# Patient Record
Sex: MALE | Race: WHITE | Employment: FULL TIME | URBAN - METROPOLITAN AREA
[De-identification: names, ages, dates, MRNs, and addresses within clinical notes are randomized per-mention and may not be internally consistent; named-entity substitution may affect disease eponyms.]

---

## 2018-11-25 ENCOUNTER — HOSPITAL ENCOUNTER (EMERGENCY)
Facility: CLINIC | Age: 58
Discharge: HOME | End: 2018-11-25
Attending: FAMILY MEDICINE | Admitting: FAMILY MEDICINE
Payer: COMMERCIAL

## 2018-11-25 ENCOUNTER — APPOINTMENT (OUTPATIENT)
Dept: GENERAL RADIOLOGY | Facility: CLINIC | Age: 58
End: 2018-11-25
Attending: FAMILY MEDICINE
Payer: COMMERCIAL

## 2018-11-25 VITALS
DIASTOLIC BLOOD PRESSURE: 94 MMHG | OXYGEN SATURATION: 96 % | RESPIRATION RATE: 14 BRPM | HEART RATE: 64 BPM | TEMPERATURE: 97.4 F | HEIGHT: 72 IN | SYSTOLIC BLOOD PRESSURE: 141 MMHG

## 2018-11-25 DIAGNOSIS — R55 SYNCOPE, UNSPECIFIED SYNCOPE TYPE: ICD-10-CM

## 2018-11-25 DIAGNOSIS — Z72.0 TOBACCO ABUSE: ICD-10-CM

## 2018-11-25 LAB
ALBUMIN SERPL-MCNC: 4.1 G/DL (ref 3.4–5)
ALP SERPL-CCNC: 103 U/L (ref 40–150)
ALT SERPL W P-5'-P-CCNC: 34 U/L (ref 0–70)
ANION GAP SERPL CALCULATED.3IONS-SCNC: 7 MMOL/L (ref 3–14)
AST SERPL W P-5'-P-CCNC: 41 U/L (ref 0–45)
BASOPHILS # BLD AUTO: 0.1 10E9/L (ref 0–0.2)
BASOPHILS NFR BLD AUTO: 1.2 %
BILIRUB SERPL-MCNC: 0.5 MG/DL (ref 0.2–1.3)
BUN SERPL-MCNC: 13 MG/DL (ref 7–30)
CALCIUM SERPL-MCNC: 8.5 MG/DL (ref 8.5–10.1)
CHLORIDE SERPL-SCNC: 103 MMOL/L (ref 94–109)
CO2 SERPL-SCNC: 29 MMOL/L (ref 20–32)
CREAT SERPL-MCNC: 0.92 MG/DL (ref 0.66–1.25)
DIFFERENTIAL METHOD BLD: ABNORMAL
EOSINOPHIL # BLD AUTO: 0.2 10E9/L (ref 0–0.7)
EOSINOPHIL NFR BLD AUTO: 3 %
ERYTHROCYTE [DISTWIDTH] IN BLOOD BY AUTOMATED COUNT: 12 % (ref 10–15)
GFR SERPL CREATININE-BSD FRML MDRD: 85 ML/MIN/1.7M2
GLUCOSE SERPL-MCNC: 84 MG/DL (ref 70–99)
HCT VFR BLD AUTO: 46.5 % (ref 40–53)
HGB BLD-MCNC: 15.7 G/DL (ref 13.3–17.7)
IMM GRANULOCYTES # BLD: 0 10E9/L (ref 0–0.4)
IMM GRANULOCYTES NFR BLD: 0.3 %
LYMPHOCYTES # BLD AUTO: 2.8 10E9/L (ref 0.8–5.3)
LYMPHOCYTES NFR BLD AUTO: 42.5 %
MCH RBC QN AUTO: 33.5 PG (ref 26.5–33)
MCHC RBC AUTO-ENTMCNC: 33.8 G/DL (ref 31.5–36.5)
MCV RBC AUTO: 99 FL (ref 78–100)
MONOCYTES # BLD AUTO: 0.8 10E9/L (ref 0–1.3)
MONOCYTES NFR BLD AUTO: 11.9 %
NEUTROPHILS # BLD AUTO: 2.7 10E9/L (ref 1.6–8.3)
NEUTROPHILS NFR BLD AUTO: 41.1 %
NRBC # BLD AUTO: 0 10*3/UL
NRBC BLD AUTO-RTO: 0 /100
PLATELET # BLD AUTO: 215 10E9/L (ref 150–450)
POTASSIUM SERPL-SCNC: 3.9 MMOL/L (ref 3.4–5.3)
PROT SERPL-MCNC: 8.1 G/DL (ref 6.8–8.8)
RBC # BLD AUTO: 4.68 10E12/L (ref 4.4–5.9)
SODIUM SERPL-SCNC: 139 MMOL/L (ref 133–144)
TROPONIN I SERPL-MCNC: <0.015 UG/L (ref 0–0.04)
WBC # BLD AUTO: 6.6 10E9/L (ref 4–11)

## 2018-11-25 PROCEDURE — 99285 EMERGENCY DEPT VISIT HI MDM: CPT | Mod: 25

## 2018-11-25 PROCEDURE — 93005 ELECTROCARDIOGRAM TRACING: CPT

## 2018-11-25 PROCEDURE — 71046 X-RAY EXAM CHEST 2 VIEWS: CPT

## 2018-11-25 PROCEDURE — 84484 ASSAY OF TROPONIN QUANT: CPT | Performed by: FAMILY MEDICINE

## 2018-11-25 PROCEDURE — 99285 EMERGENCY DEPT VISIT HI MDM: CPT | Mod: 25 | Performed by: FAMILY MEDICINE

## 2018-11-25 PROCEDURE — 80053 COMPREHEN METABOLIC PANEL: CPT | Performed by: FAMILY MEDICINE

## 2018-11-25 PROCEDURE — 85025 COMPLETE CBC W/AUTO DIFF WBC: CPT | Performed by: FAMILY MEDICINE

## 2018-11-25 PROCEDURE — 93010 ELECTROCARDIOGRAM REPORT: CPT | Mod: Z6 | Performed by: FAMILY MEDICINE

## 2018-11-25 ASSESSMENT — ENCOUNTER SYMPTOMS
SINUS PRESSURE: 0
WHEEZING: 0
SHORTNESS OF BREATH: 0
HEADACHES: 0
PALPITATIONS: 0
SORE THROAT: 0
DIARRHEA: 0
FREQUENCY: 0
VOMITING: 1
CHILLS: 0
CONSTIPATION: 0
DIAPHORESIS: 0
NAUSEA: 0
FEVER: 0
BLOOD IN STOOL: 0
ABDOMINAL PAIN: 0
COUGH: 0
DYSURIA: 0

## 2018-11-25 NOTE — ED AVS SNAPSHOT
[facility] Emergency Department    [address] [address]    Phone:  [phone]    Fax:  [phone]                                       Paul Q Severson   MRN: [id number]    Department:  [facility] Emergency Department   Date of Visit:  11/25/2018           After Visit Summary Signature Page     I have received my discharge instructions, and my questions have been answered. I have discussed any challenges I see with this plan with the nurse or doctor.    ..........................................................................................................................................  Patient/Patient Representative Signature      ..........................................................................................................................................  Patient Representative Print Name and Relationship to Patient    ..................................................               ................................................  Date                                   Time    ..........................................................................................................................................  Reviewed by Signature/Title    ...................................................              ..............................................  Date                                               Time          22EPIC Rev 08/18

## 2018-11-25 NOTE — ED NOTES
Pt here with syncopal episode. Pt started to feel faint at [facility] when trying to pay then he went in the car his Wife was driving home and she suddenly heard him snoring. Pt was unaware that he had passed out in the car. Emesis x1 since. Denies SOB, denies chest pain.

## 2018-11-25 NOTE — ED AVS SNAPSHOT
[facility] Emergency Department    [address] [address]    Phone:  [phone]    Fax:  [phone]                                       Paul Q Severson   MRN: [id number]    Department:  [facility] Emergency Department   Date of Visit:  11/25/2018           Patient Information     Date Of Birth          1960        Your diagnoses for this visit were:     Syncope, unspecified syncope type Unclear cause.  Normal evaluation here. This may have been a heart rhythm abnormality and may be considered for zio monitor for 7 days.  re-evaluation in clinic this week. stay around others today and return for any recurrent episodes.    Tobacco abuse quit!       You were seen by Saulo Chandler MD.      Follow-up Information     Follow up with Gayathri Castro Schedule an appointment as soon as possible for a visit in 1 day.    Specialty:  Physician Assistant    Contact information:    [place] [address]  [place] [id number] [phone]          Follow up with [facility] Emergency Department.    Specialty:  EMERGENCY MEDICINE    Why:  If symptoms worsen, As needed    Contact information:    [address] [place] [address] [phone]    Additional information:    The medical center is located at   [address] (between [address] in [place], four miles north   of [place]).        Discharge Instructions         ICD-10-CM    1. Syncope, unspecified syncope type R55     Unclear cause.  Normal evaluation here. This may have been a heart rhythm abnormality and may be considered for zio monitor for 7 days.  re-evaluation in clinic this week. stay around others today and return for any recurrent episodes.   2. Tobacco abuse Z72.0     quit!         Causes of Syncope  Syncope (fainting) has many causes. Sometimes it is not serious. In other cases, syncope is a sign of a heart problem. But treatment can help    When syncope is not serious  Your  healthcare provider may call your problem vasovagal syncope, reflex syncope, or orthostatic hypotension. These types of syncope are generally not serious. They can be caused by:    Strong feelings, such as anxiety or fear. A nerve signal may briefly change your heart rate and lower your blood pressure too much.    Standing for too long. Standing may cause blood to pool in your legs. When this happens, your brain may not receive all the blood it needs.    Standing up too quickly. Your blood pressure may not adjust fast enough to changes in posture and may drop too low. Certain medicines can also cause this problem. Examples of medicines that can cause a drop in blood pressure include diuretics, blood pressure medicines, and medicines for chest pain. Your pharmacist or healthcare provider can discuss these with you.    Reaction to normal body functions. When you go to the bathroom, have gastrointestinal discomfort, nausea, or pain, your heart may have a natural reflex to slow down and lower blood pressure. This can result in syncope. This may also follow exercise, eating, laughter, weight lifting, or playing musical instruments like the trumpet or trombone.  When heart trouble causes syncope  A heart problem can decrease the amount of oxygen-rich blood that reaches the brain. Heart trouble can be serious and even life threatening if not treated:    A slow heart rate. Electrical signals tell the chambers of the heart when to pump. But the signals may be slowed or blocked (heart block) as they travel on the heart s electrical pathways. This can be caused by aging, scarred heart tissue, or damage from heart disease. When the heart rate slows, not enough blood is pumped.    A fast heart rate. Certain problems can make the heart race. For instance, after a heart attack, also known as acute myocardial infarction, or AMI, abnormal electrical signals may be created. These signals can make the heart suddenly beat very fast.  The heart pumps before the chambers can fill with blood. So less blood reaches the brain and other parts of the body. Illegal drugs, certain medicines, heart disease, or an inherited condition can also cause this.    A heart valve problem. Blood travels through the chambers of the heart as it is pumped. Heart valves open and close to help move blood in the right direction. But a valve may not open or close fully, if it s hardened or scarred. As a result, less blood is pumped through the heart to the brain and body. Most often, syncope occurs when a person's aortic valve is critically narrowed and he or she participates in  a strenuous activity.    A heart muscle problem. Some people develop a thickened heart muscle that blocks blood flow out of the heart to the body. This is called hypertrophic cardiomyopathy. Being dehydrated and having hypertrophic cardiomyopathy can increase the risk for syncope.  Whatever the cause of syncope, it is important to be evaluated by your healthcare provider. You may need to be seen by a cardiologist, neurologist, or an ear, nose, and throat specialist. Do not drive, operate heavy machinery, or participate in activities in which you would be at risk for falls and injury if you have syncope and have not been evaluated.  Date Last Reviewed: 5/1/2016 2000-2018 [employer]. [address] [place] [address]. All rights reserved. This information is not intended as a substitute for professional medical care. Always follow your healthcare professional's instructions.          Your next 10 appointments already scheduled     Dec 13, 2018 10:15 AM CST   New Visit with Hayde Narayanan PA-C   [facility] ([facility]    [address] [facility] [facility] [address] [phone]              24 Hour Appointment Hotline       To make an appointment at any [facility], call [phone] ([phone]). If you don't have a family  doctor or clinic, we will help you find one. [facility] are conveniently located to serve the needs of you and your family.             Review of your medicines      Our records show that you are taking the medicines listed below. If these are incorrect, please call your family doctor or clinic.        Dose / Directions Last dose taken    SAADIA-SELTZER PLUS COLD & COUGH 5-2- MG Caps   Dose:  2 capsule   Generic drug:  Ctyzzelak-QFE-CV-APAP        Take 2 capsules by mouth once   Refills:  0        LEG CRAMP RELIEF PO   Dose:  2 tablet        Take 2 tablets by mouth At Bedtime   Refills:  0                Procedures and tests performed during your visit     CBC with platelets differential    Comprehensive metabolic panel    EKG 12 lead    Troponin I    XR Chest 2 Views      Orders Needing Specimen Collection     None      Pending Results     No orders found from 11/23/2018 to 11/26/2018.            Pending Culture Results     No orders found from 11/23/2018 to 11/26/2018.            Pending Results Instructions     If you had any lab results that were not finalized at the time of your Discharge, you can call the ED Lab Result RN at [phone]. You will be contacted by this team for any positive Lab results or changes in treatment. The nurses are available 7 days a week from 10A to 6:30P.  You can leave a message 24 hours per day and they will return your call.        Test Results From Your Hospital Stay        11/25/2018  3:56 PM      Component Results     Component Value Ref Range & Units Status    WBC 6.6 4.0 - 11.0 10e9/L Final    RBC Count 4.68 4.4 - 5.9 10e12/L Final    Hemoglobin 15.7 13.3 - 17.7 g/dL Final    Hematocrit 46.5 40.0 - 53.0 % Final    MCV 99 78 - 100 fl Final    MCH 33.5 (H) 26.5 - 33.0 pg Final    MCHC 33.8 31.5 - 36.5 g/dL Final    RDW 12.0 10.0 - 15.0 % Final    Platelet Count 215 150 - 450 10e9/L Final    Diff Method Automated Method  Final    % Neutrophils 41.1 % Final    %  Lymphocytes 42.5 % Final    % Monocytes 11.9 % Final    % Eosinophils 3.0 % Final    % Basophils 1.2 % Final    % Immature Granulocytes 0.3 % Final    Nucleated RBCs 0 0 /100 Final    Absolute Neutrophil 2.7 1.6 - 8.3 10e9/L Final    Absolute Lymphocytes 2.8 0.8 - 5.3 10e9/L Final    Absolute Monocytes 0.8 0.0 - 1.3 10e9/L Final    Absolute Eosinophils 0.2 0.0 - 0.7 10e9/L Final    Absolute Basophils 0.1 0.0 - 0.2 10e9/L Final    Abs Immature Granulocytes 0.0 0 - 0.4 10e9/L Final    Absolute Nucleated RBC 0.0  Final         11/25/2018  4:25 PM      Component Results     Component Value Ref Range & Units Status    Troponin I ES <0.015 0.000 - 0.045 ug/L Final    The 99th percentile for upper reference range is 0.045 ug/L.  Troponin values   in the range of 0.045 - 0.120 ug/L may be associated with risks of adverse   clinical events.           11/25/2018  4:25 PM      Component Results     Component Value Ref Range & Units Status    Sodium 139 133 - 144 mmol/L Final    Potassium 3.9 3.4 - 5.3 mmol/L Final    Specimen slightly hemolyzed, potassium may be falsely elevated    Chloride 103 94 - 109 mmol/L Final    Carbon Dioxide 29 20 - 32 mmol/L Final    Anion Gap 7 3 - 14 mmol/L Final    Glucose 84 70 - 99 mg/dL Final    Urea Nitrogen 13 7 - 30 mg/dL Final    Creatinine 0.92 0.66 - 1.25 mg/dL Final    GFR Estimate 85 >60 mL/min/1.7m2 Final    Non  GFR Calc    GFR Estimate If Black >90 >60 mL/min/1.7m2 Final    African American GFR Calc    Calcium 8.5 8.5 - 10.1 mg/dL Final    Bilirubin Total 0.5 0.2 - 1.3 mg/dL Final    Albumin 4.1 3.4 - 5.0 g/dL Final    Protein Total 8.1 6.8 - 8.8 g/dL Final    Alkaline Phosphatase 103 40 - 150 U/L Final    ALT 34 0 - 70 U/L Final    AST 41 0 - 45 U/L Final    Specimen is hemolyzed which can falsely elevate AST. Analysis of a   non-hemolyzed specimen may result in a lower value.           11/25/2018  4:07 PM      Narrative     CHEST TWO VIEWS   11/25/2018 4:00  "PM    HISTORY:  Syncope.    COMPARISON:  None.    FINDINGS:  The heart size is normal. No mediastinal pathology is seen.  The lungs are clear. The pulmonary vasculature is normal. No  pneumothorax or pleural effusion is seen.        Impression     IMPRESSION:  Unremarkable chest.    HUBERT BAEZ MD                Thank you for choosing [facility]       Thank you for choosing [facility] for your care. Our goal is always to provide you with excellent care. Hearing back from our patients is one way we can continue to improve our services. Please take a few minutes to complete the written survey that you may receive in the mail after you visit with us. Thank you!        [employer] Information     [employer] lets you send messages to your doctor, view your test results, renew your prescriptions, schedule appointments and more. To sign up, go to [email or web address] . Click on \"Log in\" on the left side of the screen, which will take you to the Welcome page. Then click on \"Sign up Now\" on the right side of the page.     You will be asked to enter the access code listed below, as well as some personal information. Please follow the directions to create your username and password.     Your access code is: [email or web address]  Expires: 2019  4:54 PM     Your access code will  in 90 days. If you need help or a new code, please call your [facility] clinic or [phone].        Care EveryWhere ID     This is your Care EveryWhere ID. This could be used by other organizations to access your [facility] medical records  [id number]        Equal Access to Services     LUKE NOE : Hadii fransisco dolan hadasho Soflexali, waaxda luqadaha, qaybta kaalmada adeegyada, musa davidson . So [facility] [phone].    ATENCIÓN: Si habla español, tiene a espinosa disposición servicios gratuitos de asistencia lingüística. Llame al [phone].    We comply with applicable federal civil rights laws and [place] laws. We do not " discriminate on the basis of race, color, national origin, age, disability, sex, sexual orientation, or gender identity.            After Visit Summary       This is your record. Keep this with you and show to your community pharmacist(s) and doctor(s) at your next visit.

## 2018-11-25 NOTE — DISCHARGE INSTRUCTIONS
ICD-10-CM    1. Syncope, unspecified syncope type R55     Unclear cause.  Normal evaluation here. This may have been a heart rhythm abnormality and may be considered for zio monitor for 7 days.  re-evaluation in clinic this week. stay around others today and return for any recurrent episodes.   2. Tobacco abuse Z72.0     quit!         Causes of Syncope  Syncope (fainting) has many causes. Sometimes it is not serious. In other cases, syncope is a sign of a heart problem. But treatment can help    When syncope is not serious  Your healthcare provider may call your problem vasovagal syncope, reflex syncope, or orthostatic hypotension. These types of syncope are generally not serious. They can be caused by:    Strong feelings, such as anxiety or fear. A nerve signal may briefly change your heart rate and lower your blood pressure too much.    Standing for too long. Standing may cause blood to pool in your legs. When this happens, your brain may not receive all the blood it needs.    Standing up too quickly. Your blood pressure may not adjust fast enough to changes in posture and may drop too low. Certain medicines can also cause this problem. Examples of medicines that can cause a drop in blood pressure include diuretics, blood pressure medicines, and medicines for chest pain. Your pharmacist or healthcare provider can discuss these with you.    Reaction to normal body functions. When you go to the bathroom, have gastrointestinal discomfort, nausea, or pain, your heart may have a natural reflex to slow down and lower blood pressure. This can result in syncope. This may also follow exercise, eating, laughter, weight lifting, or playing musical instruments like the trumpet or trombone.  When heart trouble causes syncope  A heart problem can decrease the amount of oxygen-rich blood that reaches the brain. Heart trouble can be serious and even life threatening if not treated:    A slow heart rate. Electrical signals tell  the chambers of the heart when to pump. But the signals may be slowed or blocked (heart block) as they travel on the heart s electrical pathways. This can be caused by aging, scarred heart tissue, or damage from heart disease. When the heart rate slows, not enough blood is pumped.    A fast heart rate. Certain problems can make the heart race. For instance, after a heart attack, also known as acute myocardial infarction, or AMI, abnormal electrical signals may be created. These signals can make the heart suddenly beat very fast. The heart pumps before the chambers can fill with blood. So less blood reaches the brain and other parts of the body. Illegal drugs, certain medicines, heart disease, or an inherited condition can also cause this.    A heart valve problem. Blood travels through the chambers of the heart as it is pumped. Heart valves open and close to help move blood in the right direction. But a valve may not open or close fully, if it s hardened or scarred. As a result, less blood is pumped through the heart to the brain and body. Most often, syncope occurs when a person's aortic valve is critically narrowed and he or she participates in  a strenuous activity.    A heart muscle problem. Some people develop a thickened heart muscle that blocks blood flow out of the heart to the body. This is called hypertrophic cardiomyopathy. Being dehydrated and having hypertrophic cardiomyopathy can increase the risk for syncope.  Whatever the cause of syncope, it is important to be evaluated by your healthcare provider. You may need to be seen by a cardiologist, neurologist, or an ear, nose, and throat specialist. Do not drive, operate heavy machinery, or participate in activities in which you would be at risk for falls and injury if you have syncope and have not been evaluated.  Date Last Reviewed: 5/1/2016 2000-2018 [employer]. [address] [facility], [place] [address]. All rights reserved. This  information is not intended as a substitute for professional medical care. Always follow your healthcare professional's instructions.

## 2018-11-25 NOTE — ED PROVIDER NOTES
History     Chief Complaint   Patient presents with     Loss of Consciousness     ate at the [employer] and on the way home lost consciousness per wife. has been vomiting since     HPI  Paul Q Severson is a 58 year old male who has been generally healthy in the past, presents after eating at the [employer].  And on the way home he apparently lost consciousness per his wife and then had one episode of vomiting.     The patient had been feeling lightheaded earlier when he was at the restaurant.  He had risen after eating to go play but he had felt lightheaded and had to sit down.  He was able to ambulate out to the car and then while in the car while his wife was driving she noted he began to snore and then appeared this if he had stopped breathing.  The entire episode lasted for seconds and then he was immediately aware and awake and had one episode of emesis.  He had no palpitations, chest pain shortness of breath or other cardiopulmonary symptoms.  He does use tobacco but has no history of hypertension hyperlipidemia or known heart disease.  Does have family history of heart disease.  No history of arrhythmia.  One prior episode of syncope about 10 years ago.    Denies recent prolonged travel (>3 hours) by car or plane, history or FHx of venous thromboembolism, recent surgery (last 4 weeks), active cancer history, hypercoagulable state, estrogen or other medications/conditions causing VTE or  new unilateral swelling or pain in the legs or calves.      He does use alcohol on the weekends but notes moderate use.    Problem List:    There are no active problems to display for this patient.       Past Medical History:    No past medical history on file.    Past Surgical History:    No past surgical history on file.    Family History:    No family history on file.    Social History:  Marital Status:   [2]  Social History   Substance Use Topics     Smoking status: Not on file     Smokeless tobacco: Not on file      Alcohol use Not on file        Medications:      No current outpatient prescriptions on file.      Review of Systems   Constitutional: Negative for chills, diaphoresis and fever.   HENT: Negative for ear pain, sinus pressure and sore throat.    Eyes: Negative for visual disturbance.   Respiratory: Negative for cough, shortness of breath and wheezing.    Cardiovascular: Negative for chest pain and palpitations.   Gastrointestinal: Positive for vomiting. Negative for abdominal pain, blood in stool, constipation, diarrhea and nausea.   Genitourinary: Negative for dysuria, frequency and urgency.   Skin: Negative for rash.   Neurological: Positive for syncope. Negative for headaches.   All other systems reviewed and are negative.      Physical Exam   BP: 134/85  Pulse: 64  Temp: 97.4  F (36.3  C)  Resp: 14  Height: 182.9 cm (6')  SpO2: 97 %      Physical Exam   Constitutional: He is oriented to person, place, and time. No distress.   HENT:   Mouth/Throat: Oropharynx is clear and moist.   Eyes: Conjunctivae and EOM are normal. Pupils are equal, round, and reactive to light.   Neck: Neck supple.   Cardiovascular: Normal rate and regular rhythm.  Exam reveals no gallop and no friction rub.    No murmur heard.  Pulmonary/Chest: Effort normal and breath sounds normal. No respiratory distress. He has no wheezes. He has no rales.   Abdominal: Soft. Bowel sounds are normal. He exhibits no distension. There is no tenderness. There is no rebound and no guarding.   Musculoskeletal: He exhibits no edema.   Neurological: He is alert and oriented to person, place, and time. He displays no tremor. No cranial nerve deficit. He exhibits normal muscle tone. He displays no seizure activity. Coordination normal. GCS eye subscore is 4. GCS verbal subscore is 5. GCS motor subscore is 6.   Skin: No rash noted. He is not diaphoretic.       ED Course     ED Course     Procedures                 EKG Interpretation:      Interpreted by aSulo  KILLIAN Chandler MD  EKG done at 1534 hrs. demonstrates a sinus rhythm at 57 bpm with a normal axis and no ST change.  No T wave changes.  Normal R progression and no Q waves.  Normal intervals.  Normal conduction.  No ectopy.  Impression sinus rhythm 57 bpm and no old EKG to compare.    Critical Care time:  none               Results for orders placed or performed during the hospital encounter of 11/25/18 (from the past 24 hour(s))   CBC with platelets differential   Result Value Ref Range    WBC 6.6 4.0 - 11.0 10e9/L    RBC Count 4.68 4.4 - 5.9 10e12/L    Hemoglobin 15.7 13.3 - 17.7 g/dL    Hematocrit 46.5 40.0 - 53.0 %    MCV 99 78 - 100 fl    MCH 33.5 (H) 26.5 - 33.0 pg    MCHC 33.8 31.5 - 36.5 g/dL    RDW 12.0 10.0 - 15.0 %    Platelet Count 215 150 - 450 10e9/L    Diff Method Automated Method     % Neutrophils 41.1 %    % Lymphocytes 42.5 %    % Monocytes 11.9 %    % Eosinophils 3.0 %    % Basophils 1.2 %    % Immature Granulocytes 0.3 %    Nucleated RBCs 0 0 /100    Absolute Neutrophil 2.7 1.6 - 8.3 10e9/L    Absolute Lymphocytes 2.8 0.8 - 5.3 10e9/L    Absolute Monocytes 0.8 0.0 - 1.3 10e9/L    Absolute Eosinophils 0.2 0.0 - 0.7 10e9/L    Absolute Basophils 0.1 0.0 - 0.2 10e9/L    Abs Immature Granulocytes 0.0 0 - 0.4 10e9/L    Absolute Nucleated RBC 0.0    Troponin I   Result Value Ref Range    Troponin I ES <0.015 0.000 - 0.045 ug/L   Comprehensive metabolic panel   Result Value Ref Range    Sodium 139 133 - 144 mmol/L    Potassium 3.9 3.4 - 5.3 mmol/L    Chloride 103 94 - 109 mmol/L    Carbon Dioxide 29 20 - 32 mmol/L    Anion Gap 7 3 - 14 mmol/L    Glucose 84 70 - 99 mg/dL    Urea Nitrogen 13 7 - 30 mg/dL    Creatinine 0.92 0.66 - 1.25 mg/dL    GFR Estimate 85 >60 mL/min/1.7m2    GFR Estimate If Black >90 >60 mL/min/1.7m2    Calcium 8.5 8.5 - 10.1 mg/dL    Bilirubin Total 0.5 0.2 - 1.3 mg/dL    Albumin 4.1 3.4 - 5.0 g/dL    Protein Total 8.1 6.8 - 8.8 g/dL    Alkaline Phosphatase 103 40 - 150 U/L    ALT 34 0 -  70 U/L    AST 41 0 - 45 U/L   XR Chest 2 Views    Narrative    CHEST TWO VIEWS   11/25/2018 4:00 PM    HISTORY:  Syncope.    COMPARISON:  None.    FINDINGS:  The heart size is normal. No mediastinal pathology is seen.  The lungs are clear. The pulmonary vasculature is normal. No  pneumothorax or pleural effusion is seen.      Impression    IMPRESSION:  Unremarkable chest.    HUBERT BAEZ MD       Medications - No data to display      Assessments & Plan (with Medical Decision Making)     MDM: Paul Q Severson is a 58 year old male who presents with a history of tobacco abuse but no other cardiovascular risk factors and had a single episode of syncope with lightheadedness, and near syncope episode initially while at the restaurant and then an episode of syncope while seated in his vehicle with his wife.  This resolved readily and on his presentation he had normal vital signs, normal EKG, normal examination as well as laboratory testing and chest x-ray.   He had no associated chest pain shortness of breath or other symptoms.  The episode was followed by single episode of emesis.  He had no neurologic changes and no head injury.  The episode was brief.    He had no signs of arrhythmia while here.  He was offered telemetry monitoring overnight but declines.  He understands potential risk for serious  arrhythmias.  This may have been a vasovagal episode the following eating.  He also notes being under significant stress.  He is given precautions for return.  Of asked him to follow-up in the clinic and consider monitoring with zio.  We also discussed tobacco cessation.      I have reviewed the nursing notes.    I have reviewed the findings, diagnosis, plan and need for follow up with the patient.       New Prescriptions    No medications on file       Final diagnoses:   Syncope, unspecified syncope type - Unclear cause.  Normal evaluation here. This may have been a heart rhythm abnormality and may be considered for  zio monitor for 7 days.  re-evaluation in clinic this week. stay around others today and return for any recurrent episodes.   Tobacco abuse - quit!       11/25/2018   [facility] EMERGENCY DEPARTMENT     Saulo Chandler MD  11/25/18 [id number]

## 2018-12-13 ENCOUNTER — OFFICE VISIT (OUTPATIENT)
Dept: DERMATOLOGY | Facility: CLINIC | Age: 58
End: 2018-12-13
Payer: COMMERCIAL

## 2018-12-13 VITALS — HEART RATE: 78 BPM | OXYGEN SATURATION: 98 % | SYSTOLIC BLOOD PRESSURE: 125 MMHG | DIASTOLIC BLOOD PRESSURE: 75 MMHG

## 2018-12-13 DIAGNOSIS — L56.4 POLYMORPHOUS LIGHT ERUPTION: Primary | ICD-10-CM

## 2018-12-13 PROCEDURE — 99203 OFFICE O/P NEW LOW 30 MIN: CPT | Performed by: PHYSICIAN ASSISTANT

## 2018-12-13 RX ORDER — CLOBETASOL PROPIONATE 0.5 MG/G
CREAM TOPICAL
COMMUNITY
Start: 2018-11-27

## 2018-12-13 NOTE — NURSING NOTE
Chief Complaint   Patient presents with     Rash       Vitals:    12/13/18 1009   BP: 125/75   Pulse: 78   SpO2: 98%     Wt Readings from Last 1 Encounters:   No data found for Wt       Candie Clifton LPN.................12/13/2018

## 2018-12-13 NOTE — LETTER
12/13/2018         RE: Paul Q Severson  [address] [address]        Dear Colleague,    Thank you for referring your patient, Paul Q Severson, to the [facility]. Please see a copy of my visit note below.    HPI:   Paul Q Severson is a 58 year old male who presents for evaluation of itchy, dry, flaky rash.  Largely resolved today although previously was from biceps to wrist.  Since may, started as red ring with white center.  chief complaint  Location: bicep to wrist previously on left arm.   Condition present for:  From May until November   Previous treatments include: Prednisone, clobetasol, Aquaphor - somewhat helpful  Social History: Lives in [place], works in [employer].    Review Of Systems  Eyes: negative  Ears/Nose/Throat: negative  Respiratory: No shortness of breath, dyspnea on exertion, cough, or hemoptysis  Cardiovascular: negative  Gastrointestinal: negative  Genitourinary: negative  Musculoskeletal: negative  Neurologic: negative  Psychiatric: negative    This document serves as a record of the services and decisions personally performed and made by Hayde Narayanan PA-C. It was created on her behalf by Magda English, a trained medical scribe. The creation of this document is based the provider's statements to the medical scribe.  Magda English December 13, 2018 10:20 AM    PHYSICAL EXAM:    /75   Pulse 78   SpO2 98%   Skin exam performed as follows: Type 2 skin. Mood appropriate  Alert and Oriented X 3. Well developed, well nourished in no distress.  General appearance: Normal  Head including face: Normal  Eyes: conjunctiva and lids: Normal  Mouth: Lips, teeth, gums: Normal  Neck: Normal  Chest-breast/axillae: Normal  Back: Normal  Spleen and liver: Normal  Cardiovascular: Exam of peripheral vascular system by observation for swelling, varicosities, edema: Normal  Genitalia: groin, buttocks: Normal  Extremities: digits/nails (clubbing): Normal  Eccrine and Apocrine  glands: Normal  Right upper extremity: Normal  Left upper extremity: Normal  Right lower extremity: Normal  Left lower extremity: Normal  Skin: Scalp and body hair: See below    1. Skin clear today    ASSESSMENT/PLAN:     1. 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 Discussed that if indeed PMLE then it will recur every summer.   --Restart clobetasol BID x 1-2 weeks PRN for eruption  --Regular use of sun screen  --Will call if he needs refill of clobetasol        Follow-up: PRN  CC:   Scribed By: Magda English, Medical Scribe    The information in this document, created by the medical scribe for me, accurately reflects the services I personally performed and the decisions made by me. I have reviewed and approved this document for accuracy prior to leaving the patient care area.  Hayde Narayanan PA-C December 13, 2018 10:29 AM       Again, thank you for allowing me to participate in the care of your patient.        Sincerely,        Hayde Narayanan PA-C

## 2018-12-13 NOTE — PROGRESS NOTES
HPI:   Paul Q Severson is a 58 year old male who presents for evaluation of itchy, dry, flaky rash.  Largely resolved today although previously was from biceps to wrist.  Since may, started as red ring with white center.  chief complaint  Location: bicep to wrist previously on left arm.   Condition present for:  From May until November   Previous treatments include: Prednisone, clobetasol, Aquaphor - somewhat helpful  Social History: Lives in [place], works in [employer].    Review Of Systems  Eyes: negative  Ears/Nose/Throat: negative  Respiratory: No shortness of breath, dyspnea on exertion, cough, or hemoptysis  Cardiovascular: negative  Gastrointestinal: negative  Genitourinary: negative  Musculoskeletal: negative  Neurologic: negative  Psychiatric: negative    This document serves as a record of the services and decisions personally performed and made by Hayde Narayanan PA-C. It was created on her behalf by Magda English, a trained medical scribe. The creation of this document is based the provider's statements to the medical scribe.  Magda English December 13, 2018 10:20 AM    PHYSICAL EXAM:    /75   Pulse 78   SpO2 98%   Skin exam performed as follows: Type 2 skin. Mood appropriate  Alert and Oriented X 3. Well developed, well nourished in no distress.  General appearance: Normal  Head including face: Normal  Eyes: conjunctiva and lids: Normal  Mouth: Lips, teeth, gums: Normal  Neck: Normal  Chest-breast/axillae: Normal  Back: Normal  Spleen and liver: Normal  Cardiovascular: Exam of peripheral vascular system by observation for swelling, varicosities, edema: Normal  Genitalia: groin, buttocks: Normal  Extremities: digits/nails (clubbing): Normal  Eccrine and Apocrine glands: Normal  Right upper extremity: Normal  Left upper extremity: Normal  Right lower extremity: Normal  Left lower extremity: Normal  Skin: Scalp and body hair: See below    1. Skin clear today    ASSESSMENT/PLAN:     1. 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 Discussed that if indeed PMLE then it will recur every summer.   --Restart clobetasol BID x 1-2 weeks PRN for eruption  --Regular use of sun screen  --Will call if he needs refill of clobetasol        Follow-up: PRN  CC:   Scribed By: Magda English, Medical Scribe    The information in this document, created by the medical scribe for me, accurately reflects the services I personally performed and the decisions made by me. I have reviewed and approved this document for accuracy prior to leaving the patient care area.  Hayde Narayanan PA-C December 13, 2018 10:29 AM

## 2023-01-30 ENCOUNTER — TELEPHONE (OUTPATIENT)
Dept: SURGERY | Facility: CLINIC | Age: 63
End: 2023-01-30
Payer: COMMERCIAL

## 2023-01-30 NOTE — TELEPHONE ENCOUNTER
Please reach out to patient to assist in setting up consult with Surgeon- does not say reason for surgery.     Thank you,   Foster CONNORS RN  [facility]

## 2023-01-30 NOTE — TELEPHONE ENCOUNTER
Reason for Call:  Other returning call    Detailed comments: Dr. Fer Fernandes, would like to refer patient to [place] for surgery so patient doesn't have to drive to [place] for Surgery.    Phone Number can be reached at: Other phone number:  [phone]    Best Time: Anytime    Can we leave a detailed message on this number? YES     Thank you,    Deb Meadows  Specialty Clinic -   [facility]      Call taken on 1/30/2023 at 12:45 PM by Deb Meadows

## 2025-06-10 ENCOUNTER — TRANSCRIBE ORDERS (OUTPATIENT)
Dept: OTHER | Age: 65
End: 2025-06-10

## 2025-06-10 DIAGNOSIS — M48.02 SPINAL STENOSIS IN CERVICAL REGION: Primary | ICD-10-CM

## 2025-06-16 ENCOUNTER — TELEPHONE (OUTPATIENT)
Dept: NEUROSURGERY | Facility: CLINIC | Age: 65
End: 2025-06-16
Payer: COMMERCIAL

## 2025-06-16 DIAGNOSIS — M54.12 CERVICAL RADICULOPATHY: Primary | ICD-10-CM

## 2025-06-16 NOTE — TELEPHONE ENCOUNTER
Date: June 16, 2025    Provider: Other     Provider/Other: Radiology Department    Reason for out-going call: ORDERS: CASE REQUEST/IMAGING/ INJECTION/ PROCEDURES       Detailed message: Left voicemail to schedule XR prior to appointment tomorrow with Dr. Cardoso.           Number provider for patient: [phone]

## 2025-06-17 ENCOUNTER — HOSPITAL ENCOUNTER (OUTPATIENT)
Dept: RADIOLOGY | Facility: HOSPITAL | Age: 65
Discharge: HOME | End: 2025-06-17
Attending: SURGERY
Payer: COMMERCIAL

## 2025-06-17 ENCOUNTER — PREP FOR PROCEDURE (OUTPATIENT)
Dept: NEUROLOGY | Facility: CLINIC | Age: 65
End: 2025-06-17

## 2025-06-17 ENCOUNTER — OFFICE VISIT (OUTPATIENT)
Dept: NEUROSURGERY | Facility: CLINIC | Age: 65
End: 2025-06-17
Attending: PHYSICIAN ASSISTANT
Payer: COMMERCIAL

## 2025-06-17 ENCOUNTER — PRE VISIT (OUTPATIENT)
Dept: NEUROSURGERY | Facility: CLINIC | Age: 65
End: 2025-06-17

## 2025-06-17 ENCOUNTER — HOSPITAL ENCOUNTER (INPATIENT)
Facility: HOSPITAL | Age: 65
Setting detail: SURGERY ADMIT
End: 2025-06-17
Attending: SURGERY | Admitting: SURGERY
Payer: COMMERCIAL

## 2025-06-17 VITALS
SYSTOLIC BLOOD PRESSURE: 132 MMHG | DIASTOLIC BLOOD PRESSURE: 74 MMHG | HEART RATE: 68 BPM | HEIGHT: 70 IN | OXYGEN SATURATION: 97 % | WEIGHT: 149.3 LBS | BODY MASS INDEX: 21.37 KG/M2

## 2025-06-17 DIAGNOSIS — M48.02 SPINAL STENOSIS IN CERVICAL REGION: Primary | ICD-10-CM

## 2025-06-17 DIAGNOSIS — M54.12 CERVICAL RADICULOPATHY: ICD-10-CM

## 2025-06-17 DIAGNOSIS — M47.12 CERVICAL SPONDYLOSIS WITH MYELOPATHY: Primary | ICD-10-CM

## 2025-06-17 PROCEDURE — 99203 OFFICE O/P NEW LOW 30 MIN: CPT | Performed by: SURGERY

## 2025-06-17 PROCEDURE — 72050 X-RAY EXAM NECK SPINE 4/5VWS: CPT

## 2025-06-17 PROCEDURE — 1125F AMNT PAIN NOTED PAIN PRSNT: CPT | Performed by: SURGERY

## 2025-06-17 PROCEDURE — 3078F DIAST BP <80 MM HG: CPT | Performed by: SURGERY

## 2025-06-17 PROCEDURE — 3075F SYST BP GE 130 - 139MM HG: CPT | Performed by: SURGERY

## 2025-06-17 RX ORDER — DILTIAZEM HYDROCHLORIDE 120 MG/1
120 CAPSULE, EXTENDED RELEASE ORAL
COMMUNITY
Start: 2024-10-04

## 2025-06-17 RX ORDER — ACETAMINOPHEN 325 MG/10.15ML
LIQUID ORAL
COMMUNITY

## 2025-06-17 RX ORDER — OMEPRAZOLE 20 MG/1
20 CAPSULE, DELAYED RELEASE ORAL
COMMUNITY
Start: 2024-10-04

## 2025-06-17 RX ORDER — PAROXETINE 10 MG/1
10 TABLET, FILM COATED ORAL DAILY
COMMUNITY
Start: 2024-11-01

## 2025-06-17 RX ORDER — ROSUVASTATIN CALCIUM 5 MG/1
5 TABLET, COATED ORAL DAILY
COMMUNITY
Start: 2024-10-04

## 2025-06-17 RX ORDER — QUETIAPINE FUMARATE 25 MG/1
25 TABLET, FILM COATED ORAL AT BEDTIME
COMMUNITY
Start: 2024-10-01

## 2025-06-17 RX ORDER — LORAZEPAM 0.5 MG/1
0.5 TABLET ORAL
COMMUNITY
Start: 2025-04-29

## 2025-06-17 RX ORDER — ROPINIROLE 0.5 MG/1
0.5 TABLET, FILM COATED ORAL
COMMUNITY
Start: 2024-10-04

## 2025-06-17 RX ORDER — ASPIRIN 81 MG/1
81 TABLET, COATED ORAL
COMMUNITY
Start: 2024-12-10

## 2025-06-17 ASSESSMENT — PAIN SCALES - GENERAL: PAINLEVEL_OUTOF10: MILD PAIN (3)

## 2025-06-17 ASSESSMENT — PATIENT HEALTH QUESTIONNAIRE - PHQ9: SUM OF ALL RESPONSES TO PHQ QUESTIONS 1-9: 5

## 2025-06-17 NOTE — NURSING NOTE
"Paul Q Severson is a 65 year old male who presents for:  Chief Complaint   Patient presents with    Consult     Patient is here for a surgical consult. Patient has pain in the lower back, runs down the right leg to ankle. Has a hard time walking. Feels like he is dragging his right leg. Having pain in the right arm pit. Pain level 3.        Initial Vitals:  /74   Pulse 68   Ht 5' 10\" (1.778 m)   Wt 149 lb 4.8 oz (67.7 kg)   SpO2 97%   BMI 21.42 kg/m   Estimated body mass index is 21.42 kg/m  as calculated from the following:    Height as of this encounter: 5' 10\" (1.778 m).    Weight as of this encounter: 149 lb 4.8 oz (67.7 kg). Body surface area is 1.83 meters squared. BP completed using cuff size: large  Mild Pain (3)        Letha Mckeon    "

## 2025-06-17 NOTE — LETTER
6/17/2025      Paul Q Severson  [address] [address]      Dear Colleague,    Thank you for referring your patient, Paul Q Severson, to the [facility]. Please see a copy of my visit note below.    NEUROSURGERY CONSULTATION NOTE      Neurosurgery was asked to see this patient by Michael Packer PA-C for evaluation of cervical stenosis.       CONSULTATION ASSESSMENT AND PLAN:    Patient is a 65-year-old male with who presents with worsening cervical myelopathy symptoms.  MRI was reviewed the patient that shows multilevel moderate to severe spinal canal stenosis from cervical 3 to cervical 7.  He also has foraminal narrowing most severe at the cervical 4-5 multilevel cervical spine.  In addition he has short segment intramedullary signal abnormality at cervical 4-5. Recommend left cervical 3-cervical 7 open-door laminoplasty with right foraminotomy at cervical 4-5.Risks and benefits were discussed in detail including but not limited to infection, hematoma, nerve damage including paralysis, post op radiculitis, durotomy, laminoplasty kyphosis, neck pain, hardware malfunction, risks associated with the use of general anesthesia.  May also develop new issues with the nerves at the levels of the laminoplasty's in the future.    We had a long discussion of the natural history of cervical myelopathy and the tendency for patient's symptoms to progress overtime.  We also discussed if elected not to have surgery any new neurological deficits may not be reversed with the surgery at a later date although acute paralysis due to catastrophic events is likely more rare.  Surgery is done to decompress the spinal cord and stabilize symptoms with a possibility to improve current symptoms but that is not guaranteed.    No further conservative care is indicated and the surgery is medically necessary for the following reasons:physical therapy is not indicated in this case as it would only  prolong the patient s suffering without providing any benefit and the delay would increase the risk of neurologic decline and/or symptoms worsening unnecessarily, with no benefit to the patient and possible harm.     There are no untreated, underlying mental health conditions (including but not limited to psychological conditions or drug or alcohol abuse) that will preclude an appropriate recovery from this surgery or that are contraindications to proceeding with surgery.     Lory Cardoso MD      HISTORY OF PRESENTING ILLNESS:    Paul Q Severson, a 65-year-old male, presented with concerns primarily related to his neck and lower back. He reported experiencing lower back pain for approximately six months, which was accompanied by weakness and dragging of his right leg, particularly when climbing stairs. He described the leg as feeling weak, making it difficult to stay upright, and noted that his right knee occasionally radha. He also mentioned experiencing tension in both legs after sitting for a while, which requires him to ease himself into walking.    More recently developed weakness, dysfunction in his arms, and balance and coordination difficulties, which began a few week ago. He experiences surges of pain under the right axilla and down the tricep, with numbness and tingling in the right arm, particularly when these surges occur. He does not report significant weakness or clumsiness in his arms, nor does he experience bowel or bladder loss. However, he does have issues with imbalance while walking, which started around six months ago.    Johnnie has been undergoing therapy for his lower back pain prior to the onset of his current symptoms. He also mentioned that he has been using nicotine.    No past medical history on file.    No past surgical history on file.    REVIEW OF SYSTEMS:  See ROS form under media     MEDICATIONS:    Current Outpatient Medications   Medication Sig Dispense Refill      "acetaminophen (TYLENOL) 325 MG/10.15ML liquid Take by mouth.       aspirin (ASA) 81 MG EC tablet Take 81 mg by mouth.       diltiazem ER (DILT-XR) 120 MG 24 hr capsule Take 120 mg by mouth.       LORazepam (ATIVAN) 0.5 MG tablet Take 0.5 mg by mouth.       omeprazole (PRILOSEC) 20 MG DR capsule Take 20 mg by mouth.       PARoxetine (PAXIL) 10 MG tablet Take 10 mg by mouth daily.       QUEtiapine (SEROQUEL) 25 MG tablet Take 25 mg by mouth at bedtime.       rOPINIRole (REQUIP) 0.5 MG tablet Take 0.5 mg by mouth.       rosuvastatin (CRESTOR) 5 MG tablet Take 5 mg by mouth daily.       clobetasol (TEMOVATE) 0.05 % external cream  (Patient not taking: Reported on 6/17/2025)       Homeopathic Products (LEG CRAMP RELIEF PO) Take 2 tablets by mouth At Bedtime (Patient not taking: Reported on 6/17/2025)       Kottwjtfa-RLE-KI-APAP (SAADIA-SELTZER PLUS COLD & COUGH) 5-2- MG CAPS Take 2 capsules by mouth once (Patient not taking: Reported on 6/17/2025)           ALLERGIES/SENSITIVITIES:     Allergies   Allergen Reactions     Sulfa Antibiotics Itching and Rash       PERTINENT SOCIAL HISTORY:   Social History     Socioeconomic History     Marital status:      Spouse name: None     Number of children: None     Years of education: None     Highest education level: None   Tobacco Use     Smoking status: Every Day     Smokeless tobacco: Never     Social Drivers of Health      Received from [employer] [facility]    Financial Resource Strain         FAMILY HISTORY:  No family history on file.     PHYSICAL EXAM:   Constitutional: /74   Pulse 68   Ht 1.778 m (5' 10\")   Wt 67.7 kg (149 lb 4.8 oz)   SpO2 97%   BMI 21.42 kg/m       Mental Status: A & O in no acute distress.  Affect is appropriate.  Speech is fluent.  Recent and remote memory are intact.  Attention span and concentration are normal.     Motor: Normal bulk and tone all muscle groups of upper and lower extremities.    Strength: " 5/5 except slightly weak right hand       Sensory: Sensation intact bilaterally to light touch.     Coordination;imbalance slightly with tandem gait. Wide based gait.     Reflexes; supinator, biceps, triceps 2+, knee/ ankle 3+ intact. No medina's    IMAGING:  I personally reviewed all radiographic images         Cc:   Gayathri Castro             Again, thank you for allowing me to participate in the care of your patient.        Sincerely,        Lory Cardoso MD    Electronically signed

## 2025-06-17 NOTE — PATIENT INSTRUCTIONS
Spinal stenosis in cervical region: Spinal stenosis in the cervical region is causing narrowing of the spinal canal, leading to symptoms such as imbalance, weakness, and pain. The spinal cord is being pinched due to disc herniations behind the vertebral bodies. The canal space is significantly reduced, which exacerbates the condition. Recommend laminoplasty to reshape the canal and relieve pressure on the spinal cord. This procedure involves drilling through one side and partially through the other to open the canal without fusion. The patient will have restrictions for three months post-surgery, including wearing a collar for six weeks and limiting lifting to 5-10 lbs initially, then under 25 lbs. Physical therapy will be initiated for balance improvement. The patient needs clearance from the primary care physician for anesthesia before scheduling surgery.     Risks and benefits were discussed in detail including but not limited to infection, hematoma, nerve damage including paralysis, post op radiculitis, durotomy, laminoplasty kyphosis, neck pain, hardware malfunction, risks associated with the use of general anesthesia.  You can also develop new issues with the nerves at the levels of the laminoplasty's in the future.    We had a long discussion of the natural history of cervical myelopathy and the tendency for patient's symptoms to progress overtime.  We also discussed if elected not to have surgery any new neurological deficits may not be reversed with the surgery at a later date although acute paralysis due to catastrophic events is likely more rare.  Surgery is done to decompress the spinal cord and stabilize symptoms with a possibility to improve current symptoms but that is not guaranteed.              Please review COMPLETE information about your proposed surgery, pre-operative requirements, post-operative course and expectations - available in a folder provided to you in  clinic!      Pre-Operative    Pre-operative physical / Lab work with primary care physician within 30 days of surgical date. We prefer the pre-op exam to be done 2 weeks prior to surgery.    If all pre-op appointments/test are not completed prior to your surgery date, you will be asked to reschedule your surgery.           As part of preparation for your upcoming procedure your primary care doctor may order a test to rule out a COVID-19 infection. This is no longer a requirement prior to surgery.     Readiness Visits    Prior to surgery, you may have a telephone or in person readiness visit with our RN team to discuss your upcomming surgery, results of your pre-op physical, and lab work.   If you will require a collar/neck brace after surgery, you will be fitted for one at your readiness visit prior to surgery (scheduled by the surgery scheduler).     Shower procedure    Hibiclens shower: Use one packet the night before surgery and one packet the morning of surgery for a whole body shower. Avoid face, hair, and genitals.      Eating/Drinking    Stop all solid foods 8 hours before surgery.  Stop all clear liquids 2 hours prior to arrival time     Clear liquids include water, clear juice, black coffee, or clear tea without milk, Gatorade, clear soda.     Medications - please refer to the pre-operative medication instructions sheet in your folder    Hold Aspirin, NSAIDs (Advil/Ibuprofen, Indocin, Naproxen,Nuprin,Relafen/Nabumetone, Diclofenac,Meloxicam, Aleve, Celebrex) and all vitamins and supplements x 7-10 days prior to surgical date  You can take Tylenol (Acetaminophen) for pain up until the date of your surgery   Do not exceed 3,000 mg per day   Any other medications prescribed, please discuss with your primary care provider at your pre-operative physical. Please discuss when/if it is safe for you to stop taking blood thinners with your primary care provider.   We will NOT provide pain medications prior to  surgery. We will prescribe post-op pain medications for up to 6 weeks after surgery.       FMLA/Short-term disability    If you are currently employed, you will likely need to be off work for 4-6 weeks for post-op recovery and healing.  Please fax any FMLA/short term disability paperwork to [phone], mail it into the clinic, drop it off in person, or send via a [employer] message.   You may also call our clinic with the date in which you'd like to return to work, and we can provide a work letter to your employer  We will support Short-Term Disability up to 12 weeks, beginning the date of your surgery. We do NOT support Long-Term Disability/Social Security Benefits.     Pain Management after surgery    Dr. Cardoso will refill pain medications for up to 6 weeks after the date of surgery. If you still need pain medication at that point you will have to go through your primary care provider.    Dealing with pain    As your body heals, you might feel a stabbing, burning, or aching pain. You may also have some numbness.  Everyone feels pain differently, we may ask you to rate your pain using a pain scale. This will let us know how much pain you feel.   Keep in mind that medicine won't take away all of your pain. It helps to try other ways to relax and ease pain.     Things to help with pain    After surgery, we will give you medicine for your pain. These medications work well, but they can make you drowsy, itchy, or sick to your stomach, and constipated. Try to take narcotics with food if they cause nausea.   For mild to moderate pain, you can take medication such as Tylenol or Ibuprofen. These can be used with narcotics and muscle relaxants. *If you have had a fusion: do NOT use NSAIDs for 6 months after surgery.   Do NOT drive while taking narcotic pain medication  Do NOT drink alcohol while using narcotic pain medication  You can utilize ice as needed (no longer than 20 minutes at one time) you may apply this over  your covered incision  Utilize heat for muscle spasms, do not apply heat over your incision  If a muscle relaxer is prescribed, please do NOT take it at the same time as your narcotic pain medication. Take them at least 90 minutes apart.   You may also use pain cream/patches on sore muscles. Do NOT apply these on your incision. Patches may be cut in 1/2 if needed.     *After your surgery, if you will be staying in-patient, a nursing team will be monitoring you closely throughout your stay and communicate your health status to your surgeon and other providers.  You will be seen by Advanced Practice Providers (e.g., nurse practitioners, clinic nurse specialists, and physician assistants) who will check on you regularly to assess the status of your surgical recovery.     Incision Care    Look at your incision site every day. You  may need a mirror, or family member to help you.   Do not submerge your incision in water such as pools, hot tubs, baths for at least 6 weeks or until incision is healed  You may get your incision wet in the shower. Allow water and soap to run over incision, and gently pat dry.   Remove the dressing the day after you are discharged from the hospital. Keep the incision clean and dry at all times. This may require several bandage changes.   Contact us right away if you have:   Fever over 101 degrees farenheit  Green or yellow drainage (pus) from your incision or increased bloody drainage   Redness, swelling, or warmth at your surgery site   Notify the clinic [phone].    Activity Restrictions    For the first 6 weeks, no lifting,pushing, or pulling > 5-10 pounds, no bending, twisting.  Use the stairs in moderation   Walking: Walking is the best way to start exercise after surgery. Take short frequent walks. You may gradually increase the distance as tolerated. If you feel pain, decrease your activity, but DO NOT stop walking. Walking will help you regain strength.  Avoid prolonged  positioning for longer than 30 minutes (change positions frequently while awake)  No contact sports until after follow up visit  No high impact activities such as; running/jogging, snowmobile or [employer] medeiros riding or any other recreational vehicles until deemed safe by your surgeon/care team.   Please call the clinic if you develop any of the following symptoms:  Swelling and/or warmth in one or both legs  Pain or tenderness in your leg, ankle, foot, or arm   Red or discolored/pale skin     Post-Op Follow Up Appointments    We will call you to schedule these appointments after your discharge from the hospital.   Incision assessment within 2 weeks with a Registered Nurse   6 week post-op with a Nurse Practitioner/Physician Assistant. Your surgeon will be available on this day.

## 2025-06-17 NOTE — PROGRESS NOTES
NEUROSURGERY CONSULTATION NOTE      Neurosurgery was asked to see this patient by Michael Packer PA-C for evaluation of cervical stenosis.       CONSULTATION ASSESSMENT AND PLAN:    Patient is a 65-year-old male with who presents with worsening cervical myelopathy symptoms.  MRI was reviewed the patient that shows multilevel moderate to severe spinal canal stenosis from cervical 3 to cervical 7.  He also has foraminal narrowing most severe at the cervical 4-5 multilevel cervical spine.  In addition he has short segment intramedullary signal abnormality at cervical 4-5. Recommend left cervical 3-cervical 7 open-door laminoplasty with right foraminotomy at cervical 4-5.Risks and benefits were discussed in detail including but not limited to infection, hematoma, nerve damage including paralysis, post op radiculitis, durotomy, laminoplasty kyphosis, neck pain, hardware malfunction, risks associated with the use of general anesthesia.  May also develop new issues with the nerves at the levels of the laminoplasty's in the future.    We had a long discussion of the natural history of cervical myelopathy and the tendency for patient's symptoms to progress overtime.  We also discussed if elected not to have surgery any new neurological deficits may not be reversed with the surgery at a later date although acute paralysis due to catastrophic events is likely more rare.  Surgery is done to decompress the spinal cord and stabilize symptoms with a possibility to improve current symptoms but that is not guaranteed.    No further conservative care is indicated and the surgery is medically necessary for the following reasons:physical therapy is not indicated in this case as it would only prolong the patient s suffering without providing any benefit and the delay would increase the risk of neurologic decline and/or symptoms worsening unnecessarily, with no benefit to the patient and possible harm.     There are no untreated,  underlying mental health conditions (including but not limited to psychological conditions or drug or alcohol abuse) that will preclude an appropriate recovery from this surgery or that are contraindications to proceeding with surgery.     Lory Cardoso MD      HISTORY OF PRESENTING ILLNESS:    Paul Q Severson, a 65-year-old male, presented with concerns primarily related to his neck and lower back. He reported experiencing lower back pain for approximately six months, which was accompanied by weakness and dragging of his right leg, particularly when climbing stairs. He described the leg as feeling weak, making it difficult to stay upright, and noted that his right knee occasionally radha. He also mentioned experiencing tension in both legs after sitting for a while, which requires him to ease himself into walking.    More recently developed weakness, dysfunction in his arms, and balance and coordination difficulties, which began a few week ago. He experiences surges of pain under the right axilla and down the tricep, with numbness and tingling in the right arm, particularly when these surges occur. He does not report significant weakness or clumsiness in his arms, nor does he experience bowel or bladder loss. However, he does have issues with imbalance while walking, which started around six months ago.    Johnnie has been undergoing therapy for his lower back pain prior to the onset of his current symptoms. He also mentioned that he has been using nicotine.    No past medical history on file.    No past surgical history on file.    REVIEW OF SYSTEMS:  See ROS form under media     MEDICATIONS:    Current Outpatient Medications   Medication Sig Dispense Refill    acetaminophen (TYLENOL) 325 MG/10.15ML liquid Take by mouth.      aspirin (ASA) 81 MG EC tablet Take 81 mg by mouth.      diltiazem ER (DILT-XR) 120 MG 24 hr capsule Take 120 mg by mouth.      LORazepam (ATIVAN) 0.5 MG tablet Take 0.5 mg by mouth.    "   omeprazole (PRILOSEC) 20 MG DR capsule Take 20 mg by mouth.      PARoxetine (PAXIL) 10 MG tablet Take 10 mg by mouth daily.      QUEtiapine (SEROQUEL) 25 MG tablet Take 25 mg by mouth at bedtime.      rOPINIRole (REQUIP) 0.5 MG tablet Take 0.5 mg by mouth.      rosuvastatin (CRESTOR) 5 MG tablet Take 5 mg by mouth daily.      clobetasol (TEMOVATE) 0.05 % external cream  (Patient not taking: Reported on 6/17/2025)      Homeopathic Products (LEG CRAMP RELIEF PO) Take 2 tablets by mouth At Bedtime (Patient not taking: Reported on 6/17/2025)      Flrgkghvw-GWU-KB-APAP (SAADIA-SELTZER PLUS COLD & COUGH) 5-2- MG CAPS Take 2 capsules by mouth once (Patient not taking: Reported on 6/17/2025)           ALLERGIES/SENSITIVITIES:     Allergies   Allergen Reactions    Sulfa Antibiotics Itching and Rash       PERTINENT SOCIAL HISTORY:   Social History     Socioeconomic History    Marital status:      Spouse name: None    Number of children: None    Years of education: None    Highest education level: None   Tobacco Use    Smoking status: Every Day    Smokeless tobacco: Never     Social Drivers of Health      Received from [employer]    Financial Resource Strain         FAMILY HISTORY:  No family history on file.     PHYSICAL EXAM:   Constitutional: /74   Pulse 68   Ht 1.778 m (5' 10\")   Wt 67.7 kg (149 lb 4.8 oz)   SpO2 97%   BMI 21.42 kg/m       Mental Status: A & O in no acute distress.  Affect is appropriate.  Speech is fluent.  Recent and remote memory are intact.  Attention span and concentration are normal.     Motor: Normal bulk and tone all muscle groups of upper and lower extremities.    Strength: 5/5 except slightly weak right hand       Sensory: Sensation intact bilaterally to light touch.     Coordination;imbalance slightly with tandem gait. Wide based gait.     Reflexes; supinator, biceps, triceps 2+, knee/ ankle 3+ intact. No medina's    IMAGING:  I " personally reviewed all radiographic images         Cc:   Gayathir Castro

## 2025-06-18 ENCOUNTER — TELEPHONE (OUTPATIENT)
Dept: NEUROSURGERY | Facility: CLINIC | Age: 65
End: 2025-06-18
Payer: COMMERCIAL

## 2025-06-18 NOTE — TELEPHONE ENCOUNTER
Scheduled surgery with: SURGEONS NAME: DR. CARDOSO    Spoke with:  PATIENT       OTHER:   n/a    Date of Surgery:   6/30/2025    Estimated Arrival time Discussed with Patient:    Yes    Location of surgery:    [facility]     Pre-Op H&P:    YES- notified patient to complete pre-op within 30 days, preferably 1 week prior to surgery date.       OTHER :    PRE OP NUSRAT 6/18/2025 W/ PCP    Post-Op Appts:    YES-  nurse visit, 6 week & 3 months    OTHER:     N/A     Does patient need Images prior:  No -     If yes- what is patient getting done: N/A    Discussed with patient pre-op RN will call 2-3 days prior to surgery with arrival time and instructions:  Yes     Did patient receive surgery folder : Yes    Method/Via: Received in clinic by RN     Surgical soap: Receiving via Received in clinic by RN       Additional Comments:  Contacted patient and schedule surgery for 6/30/2025 w/ Dr. Cardoso.   Discussed surgery details and post-op appointments patient verbalized understanding.       All patients questions were answered and patient was instructed to review surgical packet and call back with any questions or concerns.       Mimi Crockett 6/18/2025 9:31 AM

## 2025-06-24 ENCOUNTER — TELEPHONE (OUTPATIENT)
Dept: NEUROSURGERY | Facility: CLINIC | Age: 65
End: 2025-06-24

## 2025-06-24 ENCOUNTER — VIRTUAL VISIT (OUTPATIENT)
Dept: NEUROSURGERY | Facility: CLINIC | Age: 65
End: 2025-06-24
Payer: COMMERCIAL

## 2025-06-24 DIAGNOSIS — M54.12 CERVICAL RADICULOPATHY: Primary | ICD-10-CM

## 2025-06-24 PROCEDURE — 99207 PR NO CHARGE NURSE ONLY: CPT | Mod: 93

## 2025-06-24 NOTE — TELEPHONE ENCOUNTER
Date: June 24, 2025    Provider: Dr. Cardoso      Provider/Other: N/A    Reason for out-going call: OTHER      Detailed message: after conversation with our prior auth team and information provided on patient chart from insurance. Due to [facility] not being a [facility] surgery scheduled on June 30th w/ Dr. Cardoso will not be approved. And will need a network exception. Patient has the proper paper work completed for the exception.          Number provider for patient:  NEUROSURGERY: [phone]

## 2025-06-24 NOTE — PROGRESS NOTES
Called patient, discussed surgery, post-op course, expectations, follow up plan.    Reviewed H&P from 6/18/25 - cleared  Labs - 6/18/25 within acceptable limits  EKG - none per H&P    MRI done on 5/22/25 - in [place]    To OR as planned.     Procedure - Left cervical 3-cervical 7 open-door laminoplasty with right foraminotomy at cervical 4-cervical 5     Hospital - [facility]    Surgeon - Dr. Cardoso    Date of surgery - 6/30/25    Start time - 12:50 PM    Check in time - 10:50 AM    Reviewed with patient: Arrive 2 hours prior to scheduled surgery.    Reviewed NPO instructions:  Do not eat after midnight the night before surgery.  You can drink clear liquids up until 2 hours before surgery.     Patient confirmed they have help/assistance in place at home upon discharge    Instructions: using a washcloth and a bottle of provided Hibiclens, wash your body, avoiding your face and genitals. Preferably, shower the night before surgery and the morning of surgery using a half a bottle each time for your whole body shower.    NSAID: none  Anticoagulants, blood thinners: aspirin - holding 10 days  GLP-1 agonists: none  DMARD: none  Supplements, herbal remedies, vitamins: none  Medications from pain clinic: none    Patient reminded that Dr. Cardoso will refill pain medications for up to 6 weeks after the date of surgery, and if pain medication is still needed at that point they will have to go through primary care.    Instructed patient about the following: After your surgery, if you will be staying in-patient, a nursing provider team will be monitoring you closely throughout your stay and communicate your health status to your surgeon and other providers.  You will be seen by Advanced Practice Providers (e.g., nurse practitioners, clinic nurse specialist, and physician assistants) who will check on you regularly to assess the status of your surgery.

## 2025-06-26 ENCOUNTER — TELEPHONE (OUTPATIENT)
Dept: NEUROSURGERY | Facility: CLINIC | Age: 65
End: 2025-06-26
Payer: COMMERCIAL

## 2025-06-26 NOTE — TELEPHONE ENCOUNTER
Date: June 26, 2025    Provider: Dr. Cardoso      Provider/Other: N/A    Reason for out-going call: OTHER      Detailed message: LDVM-  for patient to call back and confirm if he would still like to proceed with canceling surgery per patient last phone call with Prior auth.           Number provider for patient: NATALIYA DAY : [phone]

## 2025-06-26 NOTE — TELEPHONE ENCOUNTER
Date: June 26, 2025    Provider: Dr. Cardoso      Provider/Other: N/A    Reason for out-going call: OTHER      Detailed message: surgery and post-op appts has been canceled per patient request.           Number provider for patient: NATALIYA DAY : [phone]